# Patient Record
Sex: FEMALE | Race: BLACK OR AFRICAN AMERICAN | NOT HISPANIC OR LATINO | URBAN - NONMETROPOLITAN AREA
[De-identification: names, ages, dates, MRNs, and addresses within clinical notes are randomized per-mention and may not be internally consistent; named-entity substitution may affect disease eponyms.]

---

## 2023-11-22 ENCOUNTER — OFFICE (OUTPATIENT)
Dept: URBAN - NONMETROPOLITAN AREA CLINIC 13 | Facility: CLINIC | Age: 33
End: 2023-11-22
Payer: MEDICAID

## 2023-11-22 VITALS — HEIGHT: 64 IN

## 2023-11-22 VITALS
SYSTOLIC BLOOD PRESSURE: 125 MMHG | DIASTOLIC BLOOD PRESSURE: 74 MMHG | WEIGHT: 293 LBS | HEART RATE: 101 BPM | HEIGHT: 64 IN | OXYGEN SATURATION: 94 %

## 2023-11-22 DIAGNOSIS — R10.32 LEFT LOWER QUADRANT PAIN: ICD-10-CM

## 2023-11-22 DIAGNOSIS — R19.4 CHANGE IN BOWEL HABIT: ICD-10-CM

## 2023-11-22 DIAGNOSIS — R10.31 RIGHT LOWER QUADRANT PAIN: ICD-10-CM

## 2023-11-22 DIAGNOSIS — R10.13 EPIGASTRIC PAIN: ICD-10-CM

## 2023-11-22 DIAGNOSIS — K92.1 MELENA: ICD-10-CM

## 2023-11-22 PROCEDURE — 76857 US EXAM PELVIC LIMITED: CPT | Mod: TC | Performed by: NURSE PRACTITIONER

## 2023-11-22 PROCEDURE — 76700 US EXAM ABDOM COMPLETE: CPT | Mod: TC | Performed by: NURSE PRACTITIONER

## 2023-11-22 PROCEDURE — 99204 OFFICE O/P NEW MOD 45 MIN: CPT | Mod: 25 | Performed by: NURSE PRACTITIONER

## 2023-11-22 RX ORDER — LINACLOTIDE 72 UG/1
CAPSULE, GELATIN COATED ORAL
Qty: 90 | Refills: 1 | Status: ACTIVE
Start: 2023-11-22

## 2023-11-22 RX ORDER — HYDROCORTISONE 25 MG/G
CREAM TOPICAL
Qty: 30 | Refills: 3 | Status: ACTIVE
Start: 2023-11-22

## 2023-11-23 LAB
AMYLASE: 54 UNITS/L (ref 30–110)
CBC WITH WBC (DIFF): ACANTHOCYTE: NORMAL
CBC WITH WBC (DIFF): AGRANULAR NEUTROPHIL: NORMAL
CBC WITH WBC (DIFF): ANISOCYTOSIS: NORMAL
CBC WITH WBC (DIFF): ATYPICAL LYMPHOCYTE: NORMAL
CBC WITH WBC (DIFF): AUER RODS: NORMAL
CBC WITH WBC (DIFF): BAND: NORMAL
CBC WITH WBC (DIFF): BASOPHIL: 1 % (ref 0–1)
CBC WITH WBC (DIFF): BASOPHILIC STIPPLING: NORMAL
CBC WITH WBC (DIFF): BI-LOBED NEUTROPHIL: NORMAL
CBC WITH WBC (DIFF): BLAST: NORMAL
CBC WITH WBC (DIFF): BURR CELL: NORMAL
CBC WITH WBC (DIFF): DIMORPHIC RBC POPULATION: NORMAL
CBC WITH WBC (DIFF): DOHLE BODIES: NORMAL
CBC WITH WBC (DIFF): ELLIPTOCYTE: NORMAL
CBC WITH WBC (DIFF): EOSINOPHIL: 3 % (ref 0–5)
CBC WITH WBC (DIFF): GIANT PLATELET: NORMAL
CBC WITH WBC (DIFF): HEMATOCRIT: 44.8 % (ref 36–46)
CBC WITH WBC (DIFF): HEMOGLOBIN: 13.4 G/DL (ref 12–16)
CBC WITH WBC (DIFF): HOWELL JOLLY BODIES: NORMAL
CBC WITH WBC (DIFF): HYPERSEGMENTED NEUTROPHIL: NORMAL
CBC WITH WBC (DIFF): HYPOCHROMIA: NORMAL
CBC WITH WBC (DIFF): HYPOGRANULAR NEUTROPHIL: NORMAL
CBC WITH WBC (DIFF): IMMATURE GRANULOCYTES: 0 % (ref 0–1)
CBC WITH WBC (DIFF): LARGE PLATELET: NORMAL
CBC WITH WBC (DIFF): LYMPHOCYTE: 37 % (ref 20–40)
CBC WITH WBC (DIFF): MACROCYTOSIS: NORMAL
CBC WITH WBC (DIFF): MEAN CELL HEMOGLOBIN CONCENTRATION: 29.9 G/DL — LOW (ref 33–37)
CBC WITH WBC (DIFF): MEAN CELL HEMOGLOBIN: 29.1 PG (ref 27–31)
CBC WITH WBC (DIFF): MEAN CELL VOLUME: 97 FL (ref 84–100)
CBC WITH WBC (DIFF): MEAN PLATELET VOLUME: 10.4 FL (ref 8.3–11.9)
CBC WITH WBC (DIFF): METAMYELOCYTE: NORMAL
CBC WITH WBC (DIFF): MICROCYTOSIS: NORMAL
CBC WITH WBC (DIFF): MIX CELLS: NORMAL
CBC WITH WBC (DIFF): MONOCYTE: 9 % (ref 1–10)
CBC WITH WBC (DIFF): MYELOCYTE: NORMAL
CBC WITH WBC (DIFF): NEUTROPHIL SEGMENTED: 50 % (ref 50–70)
CBC WITH WBC (DIFF): NOTE: NORMAL
CBC WITH WBC (DIFF): NUCLEATED RBC: 0 /100WBC (ref 0–0)
CBC WITH WBC (DIFF): OVALOCYTE: NORMAL
CBC WITH WBC (DIFF): PAPPENHEIMER BODIES: NORMAL
CBC WITH WBC (DIFF): PATHOLOGIST INTERPRETATION: NORMAL
CBC WITH WBC (DIFF): PLATELET CLUMPS: NORMAL
CBC WITH WBC (DIFF): PLATELET COUNT: 380 /NL (ref 140–440)
CBC WITH WBC (DIFF): PLT SATELLITOSIS: NORMAL
CBC WITH WBC (DIFF): POIKILOCYTOSIS: NORMAL
CBC WITH WBC (DIFF): POLYCHROMASIA: NORMAL
CBC WITH WBC (DIFF): PROMYELOCYTE: NORMAL
CBC WITH WBC (DIFF): RBC MORPHOLOGY: NORMAL
CBC WITH WBC (DIFF): REACT LYMPH ABS MAN: NORMAL
CBC WITH WBC (DIFF): REACTIVE LYMPHOCYTE: NORMAL
CBC WITH WBC (DIFF): RED BLOOD CELL: 4.6 /PL (ref 4.2–5.4)
CBC WITH WBC (DIFF): RED CELL DIAMETER WIDTH: 50 FL — HIGH (ref 35–48)
CBC WITH WBC (DIFF): ROULEAUX RBC: NORMAL
CBC WITH WBC (DIFF): SCHISTOCYTE: NORMAL
CBC WITH WBC (DIFF): SICKLE CELL: NORMAL
CBC WITH WBC (DIFF): SMUDGE CELLS: NORMAL
CBC WITH WBC (DIFF): SPHEROCYTE: NORMAL
CBC WITH WBC (DIFF): STOMATOCYTE: NORMAL
CBC WITH WBC (DIFF): TARGET CELL: NORMAL
CBC WITH WBC (DIFF): TEAR DROP CELL: NORMAL
CBC WITH WBC (DIFF): TOXIC GRANULATION: NORMAL
CBC WITH WBC (DIFF): UNCLASSIFIED CELL: NORMAL
CBC WITH WBC (DIFF): VACUOLATED NEUTROPHIL: NORMAL
CBC WITH WBC (DIFF): WBC MORPHOLOGY: NORMAL
CBC WITH WBC (DIFF): WHITE BLOOD CELL: 8 /NL (ref 4.8–11)
COMPREHENSIVE METABOLIC PANEL: ALBUMIN: 4.3 G/DL (ref 3.5–4.8)
COMPREHENSIVE METABOLIC PANEL: ALKALINE PHOSPHATASE: 82 UNITS/L (ref 36–126)
COMPREHENSIVE METABOLIC PANEL: ALT: 13 UNITS/L (ref ?–34)
COMPREHENSIVE METABOLIC PANEL: ANION GAP: 13 MMOL/L (ref 7–16)
COMPREHENSIVE METABOLIC PANEL: AST: 18 UNITS/L (ref 14–36)
COMPREHENSIVE METABOLIC PANEL: BILIRUBIN, TOTAL: 0.7 MG/DL (ref 0.2–1.3)
COMPREHENSIVE METABOLIC PANEL: BUN/CREATININE RATIO: 12.4
COMPREHENSIVE METABOLIC PANEL: CALCIUM: 8.9 MG/DL (ref 8.4–10.2)
COMPREHENSIVE METABOLIC PANEL: CARBON DIOXIDE: 24 MMOL/L (ref 22–30)
COMPREHENSIVE METABOLIC PANEL: CHLORIDE: 106 MMOL/L (ref 98–107)
COMPREHENSIVE METABOLIC PANEL: CREATININE: 0.97 MG/DL (ref 0.52–1.04)
COMPREHENSIVE METABOLIC PANEL: GLUCOSE: 101 MG/DL (ref 65–105)
COMPREHENSIVE METABOLIC PANEL: POTASSIUM: 4.3 MMOL/L (ref 3.5–5.3)
COMPREHENSIVE METABOLIC PANEL: PROTEIN, TOTAL, SERUM: 7.2 G/DL (ref 6.3–8.2)
COMPREHENSIVE METABOLIC PANEL: SODIUM: 143 MMOL/L (ref 137–145)
COMPREHENSIVE METABOLIC PANEL: UREA NITROGEN (BUN): 12 MG/DL (ref 7–17)
GFR: EGFR AFRICAN AMERICAN: >60 ML/MIN/1.73M2
GFR: EGFR NON-AFR.AMERICAN: >60 ML/MIN/1.73M2
LIPASE: 49 UNITS/L (ref 23–300)
TSH: 2.67 MICRO-INTL UNIT (ref 0.47–4.68)

## 2024-01-03 ENCOUNTER — OFFICE (OUTPATIENT)
Dept: URBAN - NONMETROPOLITAN AREA CLINIC 13 | Facility: CLINIC | Age: 34
End: 2024-01-03
Payer: MEDICAID

## 2024-01-03 ENCOUNTER — AMBULATORY SURGICAL CENTER (OUTPATIENT)
Dept: URBAN - NONMETROPOLITAN AREA SURGERY 2 | Facility: SURGERY | Age: 34
End: 2024-01-03
Payer: MEDICAID

## 2024-01-03 VITALS
RESPIRATION RATE: 9 BRPM | HEART RATE: 79 BPM | SYSTOLIC BLOOD PRESSURE: 113 MMHG | SYSTOLIC BLOOD PRESSURE: 114 MMHG | HEART RATE: 92 BPM | RESPIRATION RATE: 18 BRPM | OXYGEN SATURATION: 96 % | RESPIRATION RATE: 17 BRPM | OXYGEN SATURATION: 95 % | SYSTOLIC BLOOD PRESSURE: 115 MMHG | DIASTOLIC BLOOD PRESSURE: 70 MMHG | DIASTOLIC BLOOD PRESSURE: 76 MMHG | DIASTOLIC BLOOD PRESSURE: 71 MMHG | OXYGEN SATURATION: 98 % | DIASTOLIC BLOOD PRESSURE: 73 MMHG | SYSTOLIC BLOOD PRESSURE: 122 MMHG | DIASTOLIC BLOOD PRESSURE: 81 MMHG | TEMPERATURE: 97.6 F | OXYGEN SATURATION: 100 % | HEART RATE: 88 BPM | HEART RATE: 90 BPM | RESPIRATION RATE: 23 BRPM | SYSTOLIC BLOOD PRESSURE: 130 MMHG | HEART RATE: 84 BPM | HEART RATE: 78 BPM | RESPIRATION RATE: 12 BRPM | HEART RATE: 70 BPM | DIASTOLIC BLOOD PRESSURE: 75 MMHG | HEART RATE: 87 BPM | OXYGEN SATURATION: 99 % | SYSTOLIC BLOOD PRESSURE: 107 MMHG | WEIGHT: 293 LBS | SYSTOLIC BLOOD PRESSURE: 116 MMHG | HEIGHT: 64 IN

## 2024-01-03 DIAGNOSIS — K92.1 MELENA: ICD-10-CM

## 2024-01-03 DIAGNOSIS — K29.50 UNSPECIFIED CHRONIC GASTRITIS WITHOUT BLEEDING: ICD-10-CM

## 2024-01-03 DIAGNOSIS — K31.89 OTHER DISEASES OF STOMACH AND DUODENUM: ICD-10-CM

## 2024-01-03 DIAGNOSIS — R10.13 EPIGASTRIC PAIN: ICD-10-CM

## 2024-01-03 PROCEDURE — 43239 EGD BIOPSY SINGLE/MULTIPLE: CPT | Performed by: INTERNAL MEDICINE

## 2024-01-03 PROCEDURE — G8907 PT DOC NO EVENTS ON DISCHARG: HCPCS | Performed by: INTERNAL MEDICINE

## 2024-01-03 PROCEDURE — 88305 TISSUE EXAM BY PATHOLOGIST: CPT | Mod: TC | Performed by: INTERNAL MEDICINE

## 2024-01-03 PROCEDURE — 88312 SPECIAL STAINS GROUP 1: CPT | Mod: TC | Performed by: INTERNAL MEDICINE

## 2024-01-03 PROCEDURE — 88313 SPECIAL STAINS GROUP 2: CPT | Mod: TC | Performed by: INTERNAL MEDICINE

## 2024-01-03 RX ORDER — CIMETIDINE 400 MG/1
TABLET, FILM COATED ORAL
Qty: 180 | Refills: 1 | Status: COMPLETED
Start: 2024-01-03 | End: 2024-01-09 | Stop reason: CLARIF

## 2024-01-03 RX ORDER — OMEPRAZOLE 40 MG/1
CAPSULE, DELAYED RELEASE ORAL
Qty: 90 | Refills: 1 | Status: ACTIVE
Start: 2024-01-03

## 2024-01-03 RX ORDER — PANTOPRAZOLE 40 MG/1
TABLET, DELAYED RELEASE ORAL
Qty: 30 | Status: COMPLETED
End: 2024-01-03

## 2024-01-08 LAB
PATHOLOGY REPORT: APSREPORT: (no result) 1
PATHOLOGY REPORT: PDF: (no result) 1

## 2024-06-24 ENCOUNTER — OFFICE (OUTPATIENT)
Dept: URBAN - NONMETROPOLITAN AREA CLINIC 1 | Facility: CLINIC | Age: 34
End: 2024-06-24
Payer: COMMERCIAL

## 2024-06-24 VITALS
HEART RATE: 108 BPM | WEIGHT: 293 LBS | HEIGHT: 67 IN | DIASTOLIC BLOOD PRESSURE: 89 MMHG | SYSTOLIC BLOOD PRESSURE: 133 MMHG

## 2024-06-24 DIAGNOSIS — Z72.0 TOBACCO USE: ICD-10-CM

## 2024-06-24 DIAGNOSIS — K76.0 FATTY (CHANGE OF) LIVER, NOT ELSEWHERE CLASSIFIED: ICD-10-CM

## 2024-06-24 DIAGNOSIS — K21.9 GASTRO-ESOPHAGEAL REFLUX DISEASE WITHOUT ESOPHAGITIS: ICD-10-CM

## 2024-06-24 DIAGNOSIS — K92.1 MELENA: ICD-10-CM

## 2024-06-24 PROCEDURE — 99204 OFFICE O/P NEW MOD 45 MIN: CPT | Performed by: NURSE PRACTITIONER

## 2024-06-24 PROCEDURE — 36415 COLL VENOUS BLD VENIPUNCTURE: CPT | Performed by: NURSE PRACTITIONER

## 2024-06-24 RX ORDER — PANTOPRAZOLE SODIUM 40 MG/1
40 TABLET, DELAYED RELEASE ORAL
Qty: 30 | Refills: 2 | Status: ACTIVE
Start: 2024-06-24

## 2024-06-24 NOTE — SERVICEHPINOTES
Patient with a history of bipolar, GERD, fatty liver presents to the clinic today for rectal bleeding.  She is an established patient at [facility] and has seen LATOSHA Rhoades for GERD, rectal bleeding, constipation in November of 2023.    Initial workup in 2023 revealed normal labs and transaminases, abdominal ultrasound that revealed hepatic steatosis. she then underwent EGD 01/2024 that revealed gastritis YOAN negative.  Colonoscopy was ordered and scheduled however patient reports she received a phone call from the [facility] office and cancel the colonoscopy procedure and referred her here to our location.  Regarding GERD, she reports her s/s have improved somewhat with medication- taking famotidine 40mg po qd, and cimetidine 400mg po bid for gerd.  She still C/o of bloody stools and constipation.  She has tried miralax otc and Linzess but she states these made her have diarrhea.  Has tried hydrocortisone 2.5% ointment for hemorrhoids in which resolved the rectal itching, but not the bleeding.  She has a good appetite and has a BM every couple of days, strains occasionally with defecation.  Smokes 7 cigarettes daily.   Has recently stopped taking Ibuprofen.  Denies cardiac stents, anticoagulation therapy, ckd, or supplemental oxygenation use.  br
br   stop cimetidine and omeprazole start pantoprazole in am and continue famotidine 40mg po qhs.  
br

br EGD by Dr. Andre 1/3/24
br	Erythema, friability and streaking in the stomach compatible with gastritis. (Biopsy).br	Normal mucosa in the whole esophagus.br	Normal mucosa in the duodenum.br	11/27/2023 - US ABD/PELVIC - Fatty liver (Normal LFTs).
br Gastric BX yoan neg 
br br
Abdominal ultrasound 11/22/2023 
br liver normal, no focal mass or intrahepatic ductal dilatation.  Gallbladder normal no stones or wall thickening no tenderness.  Common bile duct is not dilated, 4 mm fatty liver, nonobstructing right renal calyceal calculi.
br
br
Labs [facility] 11/22/23 alk phos 82,  ALT 13, AST 18, T bili 0.7, hemoglobin 13.4, hematocrit 44.8, lipase 49, amyase 54, tsh 2.670br

## 2024-06-24 NOTE — SERVICENOTES
Risks of procedure explained to patient she wishes to proceed 
Bowel prep prescribed instructions given to patient 
Colonoscopy for hematochezia, change in bowel habits, constipation 
Advised patient to start MiraLax and stool softeners over-the-counter and titrate accordingly to promote daily soft bowel movements and prevent straining with defecation
Continue rx'd steroid ointment prn for suspected hemorrhoids
Increase fiber and water intake daily 
Fatty liver found on abdominal imaging 11/2023- we will recheck CBC CMP today and trend liver enzymes advised patient to lose 10% of body weight and start a Mediterranean diet 
Will follow up in 3 months fasting regarding fatty liver and obtain FibroScan at that time
Stop Omeprazole and Cimetidine. Start Panto as prescribed above for gerd and continue Famotidine qhs as prescribed by pcp.